# Patient Record
Sex: FEMALE | Race: WHITE | NOT HISPANIC OR LATINO | Employment: PART TIME | URBAN - METROPOLITAN AREA
[De-identification: names, ages, dates, MRNs, and addresses within clinical notes are randomized per-mention and may not be internally consistent; named-entity substitution may affect disease eponyms.]

---
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
## 2024-12-04 ENCOUNTER — HOSPITAL ENCOUNTER (EMERGENCY)
Facility: HOSPITAL | Age: 19
Discharge: HOME | End: 2024-12-04
Attending: INTERNAL MEDICINE
Payer: MEDICAID

## 2024-12-04 VITALS
HEART RATE: 62 BPM | DIASTOLIC BLOOD PRESSURE: 71 MMHG | SYSTOLIC BLOOD PRESSURE: 119 MMHG | WEIGHT: 200 LBS | RESPIRATION RATE: 16 BRPM | HEIGHT: 69 IN | BODY MASS INDEX: 29.62 KG/M2 | OXYGEN SATURATION: 100 % | TEMPERATURE: 98 F

## 2024-12-04 DIAGNOSIS — M25.571 RIGHT ANKLE PAIN: ICD-10-CM

## 2024-12-04 DIAGNOSIS — M79.671 RIGHT FOOT PAIN: ICD-10-CM

## 2024-12-04 LAB — B-HCG UR QL: NEGATIVE

## 2024-12-04 PROCEDURE — 81025 URINE PREGNANCY TEST: CPT | Performed by: PHYSICIAN ASSISTANT

## 2024-12-04 PROCEDURE — 25000003 PHARM REV CODE 250: Performed by: PHYSICIAN ASSISTANT

## 2024-12-04 PROCEDURE — 99284 EMERGENCY DEPT VISIT MOD MDM: CPT | Mod: 25

## 2024-12-04 RX ORDER — KETOROLAC TROMETHAMINE 10 MG/1
10 TABLET, FILM COATED ORAL
Status: COMPLETED | OUTPATIENT
Start: 2024-12-04 | End: 2024-12-04

## 2024-12-04 RX ORDER — KETOROLAC TROMETHAMINE 10 MG/1
10 TABLET, FILM COATED ORAL EVERY 6 HOURS
Qty: 20 TABLET | Refills: 0 | Status: SHIPPED | OUTPATIENT
Start: 2024-12-04 | End: 2024-12-09

## 2024-12-04 RX ORDER — METHOCARBAMOL 750 MG/1
1500 TABLET, FILM COATED ORAL 3 TIMES DAILY
Qty: 42 TABLET | Refills: 0 | Status: SHIPPED | OUTPATIENT
Start: 2024-12-04 | End: 2024-12-11

## 2024-12-04 RX ADMIN — KETOROLAC TROMETHAMINE 10 MG: 10 TABLET, FILM COATED ORAL at 06:12

## 2024-12-04 NOTE — Clinical Note
"Omar Bolanos" Cristi was seen and treated in our emergency department on 12/4/2024.  She may return to work on 12/04/2024.       If you have any questions or concerns, please don't hesitate to call.       RN    "

## 2024-12-05 NOTE — DISCHARGE INSTRUCTIONS
Ice and elevation, 20 minutes on and 20 minutes off.  Wear Ace bandage for support.  Use Toradol for pain and swelling.  Take Robaxin for muscle pain and spasms.  Follow up with PCP for further evaluation with possible MRI/Orthopedics.

## 2024-12-05 NOTE — ED PROVIDER NOTES
Encounter Date: 12/4/2024       History     Chief Complaint   Patient presents with    Ankle Pain     R ankle pain   mult trips and twist over past 3 months   re injured 2 days ago       19-year-old female presents to ED for evaluation of right foot pain and swelling.  Patient reports 2 weeks ago that she fell out of a truck and then several days ago she fell out of a tree onto her foot.  States that she was now unable to walk on her foot.  Complains of pain in her right heel that goes into her forefoot.   Patient states she was to walk 2 miles every day for softball in his having pain when doing this.  Has not taken any medications.    The history is provided by the patient. No  was used.     Review of patient's allergies indicates:   Allergen Reactions    Blueberry     Claritin [loratadine]     Flexeril [cyclobenzaprine]      Past Medical History:   Diagnosis Date    Asthma      Past Surgical History:   Procedure Laterality Date    ADENOIDECTOMY      CHOLECYSTECTOMY      TONSILLECTOMY       Family History   Problem Relation Name Age of Onset    Hypertension Mother      Diabetes Father       Social History     Tobacco Use    Smoking status: Never    Smokeless tobacco: Never   Substance Use Topics    Alcohol use: Never    Drug use: Never     Review of Systems   Constitutional:  Negative for chills, fatigue and fever.   Respiratory:  Negative for shortness of breath.    Cardiovascular:  Negative for chest pain.   Gastrointestinal:  Negative for abdominal pain, diarrhea, nausea and vomiting.   Genitourinary:  Negative for dysuria, flank pain, frequency and urgency.   Musculoskeletal:  Positive for back pain and myalgias.   All other systems reviewed and are negative.      Physical Exam     Initial Vitals [12/04/24 1541]   BP Pulse Resp Temp SpO2   127/74 (!) 57 16 97.8 °F (36.6 °C) 100 %      MAP       --         Physical Exam    Nursing note and vitals reviewed.  Constitutional: She appears  well-developed and well-nourished.   HENT:   Head: Normocephalic and atraumatic.   Right Ear: Tympanic membrane and external ear normal.   Left Ear: Tympanic membrane and external ear normal. Mouth/Throat: Uvula is midline, oropharynx is clear and moist and mucous membranes are normal. No trismus in the jaw. No uvula swelling. No oropharyngeal exudate, posterior oropharyngeal edema or posterior oropharyngeal erythema.   Eyes: Conjunctivae are normal. Pupils are equal, round, and reactive to light.   Neck: Neck supple.   Normal range of motion.  Cardiovascular:  Normal rate, regular rhythm and normal heart sounds.           Pulmonary/Chest: Breath sounds normal. She has no wheezes. She has no rhonchi. She has no rales.   Abdominal: Abdomen is soft. Bowel sounds are normal. There is no abdominal tenderness.   Musculoskeletal:         General: Normal range of motion.      Cervical back: Normal range of motion and neck supple.      Right foot: Normal range of motion. Tenderness present. No swelling, deformity, bony tenderness or crepitus.        Feet:       Comments: DP pulses 2+. All other adjacent joints otherwise normal       Neurological: She is alert and oriented to person, place, and time. She has normal strength. No sensory deficit. GCS score is 15. GCS eye subscore is 4. GCS verbal subscore is 5. GCS motor subscore is 6.   Skin: Skin is warm and dry.   Psychiatric: She has a normal mood and affect.         ED Course   Procedures  Labs Reviewed   PREGNANCY TEST, URINE RAPID - Normal       Result Value    hCG Qualitative, Urine Negative            Imaging Results              X-Ray Foot Complete Right (Final result)  Result time 12/04/24 18:04:50      Final result by Arsh Lyon MD (12/04/24 18:04:50)                   Impression:      No acute osseous abnormality.      Electronically signed by: Arsh Lyon MD  Date:    12/04/2024  Time:    18:04               Narrative:    EXAMINATION:  Right foot three  views    CLINICAL HISTORY:  Pain    COMPARISON:  None    FINDINGS:  No fracture subluxation seen.  Joint spaces are maintained.  No erosive or proliferative changes.  No focal soft tissue swelling.                                       X-Ray Ankle Complete Right (Final result)  Result time 12/04/24 16:54:48      Final result by Charli Coats MD (12/04/24 16:54:48)                   Narrative:    EXAMINATION  XR ANKLE COMPLETE 3 VIEW RIGHT    CLINICAL HISTORY  Pain in right ankle and joints of right foot    TECHNIQUE  A total of 3 images submitted of the right ankle.    COMPARISON  None available at the time of initial interpretation.    FINDINGS  No displaced fracture or dislocation is identified. The visualized joint spaces are preserved and there are no findings indicative of a joint effusion. No aggressive osseous lesion or periosteal reaction is identified.    The included soft tissues are without acute abnormality.    IMPRESSION  No convincing acute abnormality.      Electronically signed by: Charli Coats  Date:    12/04/2024  Time:    16:54                                     Medications   ketorolac tablet 10 mg (10 mg Oral Given 12/4/24 1826)     Medical Decision Making    19-year-old female presents to ED for evaluation of right foot pain and swelling.  Patient reports 2 weeks ago that she fell out of a truck and then several days ago she fell out of a tree onto her foot.  States that she was now unable to walk on her foot.  Complains of pain in her right heel that goes into her forefoot.   Patient states she was to walk 2 miles every day for softball in his having pain when doing this.  Has not taken any medications.      Differential diagnosis includes but isn't limited to contusion, fracture, dislocation, sprain, strain, internal foot injury.    Amount and/or Complexity of Data Reviewed  Radiology: ordered.  Discussion of management or test interpretation with external provider(s):   Patient GCS 15 and  neuro intact.  Ambulatory with steady gait.  Presents to ED for evaluation of right foot pain and swelling.  Patient reports having multiple injuries her foot and now she has pain in her right heel that goes into the for foot.  No medications taken.  X-ray obtained here without any acute findings.  Discussed follow up with PCP for possible MRI/orthopedic referral.  Will give short course of NSAIDs and muscle relaxers.  Patient verbalizes understanding    Risk  Prescription drug management.                                      Clinical Impression:  Final diagnoses:  [M25.571] Right ankle pain  [M79.671] Right foot pain          ED Disposition Condition    Discharge           ED Prescriptions       Medication Sig Dispense Start Date End Date Auth. Provider    ketorolac (TORADOL) 10 mg tablet Take 1 tablet (10 mg total) by mouth every 6 (six) hours. for 5 days 20 tablet 12/4/2024 12/9/2024 Clarisa Miguel PA    methocarbamoL (ROBAXIN) 750 MG Tab Take 2 tablets (1,500 mg total) by mouth 3 (three) times daily. for 7 days 42 tablet 12/4/2024 12/11/2024 Clarisa Miguel PA          Follow-up Information       Follow up With Specialties Details Why Contact Info    PCP  In 1 week As needed, If you do not have a PCP you may call [phone] to help get set up If you do not have a PCP you may call [phone] to help get set up.             Clarisa Miguel PA  12/04/24 6469

## 2025-04-28 ENCOUNTER — OFFICE VISIT (OUTPATIENT)
Dept: URGENT CARE | Facility: CLINIC | Age: 20
End: 2025-04-28
Payer: MEDICAID

## 2025-04-28 VITALS
SYSTOLIC BLOOD PRESSURE: 118 MMHG | TEMPERATURE: 99 F | RESPIRATION RATE: 18 BRPM | DIASTOLIC BLOOD PRESSURE: 79 MMHG | HEART RATE: 83 BPM | BODY MASS INDEX: 31.1 KG/M2 | WEIGHT: 210 LBS | OXYGEN SATURATION: 97 % | HEIGHT: 69 IN

## 2025-04-28 DIAGNOSIS — S40.862A INSECT BITE OF LEFT UPPER EXTREMITY, INITIAL ENCOUNTER: Primary | ICD-10-CM
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
## 2025-04-28 DIAGNOSIS — L03.114 CELLULITIS OF LEFT UPPER EXTREMITY: ICD-10-CM

## 2025-04-28 PROCEDURE — 99203 OFFICE O/P NEW LOW 30 MIN: CPT | Mod: ,,, | Performed by: FAMILY MEDICINE

## 2025-04-28 RX ORDER — ETONOGESTREL 68 MG/1
IMPLANT SUBCUTANEOUS
COMMUNITY

## 2025-04-28 RX ORDER — DOXYCYCLINE 100 MG/1
100 CAPSULE ORAL EVERY 12 HOURS
Qty: 14 CAPSULE | Refills: 0 | Status: SHIPPED | OUTPATIENT
Start: 2025-04-28 | End: 2025-05-05

## 2025-04-28 RX ORDER — PREDNISONE 20 MG/1
20 TABLET ORAL 2 TIMES DAILY
Qty: 6 TABLET | Refills: 0 | Status: SHIPPED | OUTPATIENT
Start: 2025-04-28 | End: 2025-05-01

## 2025-04-28 NOTE — PROGRESS NOTES
"Patient ID: Omar Colin is a 19 y.o. female.  Chief Complaint: Insect Bite    HPI:   Patient presents here today for above reason.      Patient is a 19 y.o. female who presents to urgent care with complaints of possible spider bite on left arm, itchiness, warm to touch and expanding, fever x yesterday.   Marked area of redness following initial discovery of bite/puncture wound etc..  Area has increased outwardly circumferentially.          Past Medical History:  Past Medical History:   Diagnosis Date    Asthma      Past Surgical History:   Procedure Laterality Date    ADENOIDECTOMY      CHOLECYSTECTOMY      TONSILLECTOMY       Review of patient's allergies indicates:   Allergen Reactions    Flexeril [cyclobenzaprine] Anaphylaxis    Blueberry     Claritin [loratadine]      Current Outpatient Medications   Medication Instructions    albuterol (PROVENTIL HFA) 90 mcg/actuation inhaler 2 puffs, Inhalation, Every 6 hours PRN, Rescue    doxycycline (MONODOX) 100 mg, Oral, Every 12 hours    etonogestreL (NEXPLANON) 68 mg Impl intradermal implant     hyoscyamine (ANASPAZ,LEVSIN) 125 mcg, Oral, Every 6 hours PRN    predniSONE (DELTASONE) 20 mg, Oral, 2 times daily     Social History[1]    ROS:   Review of Systems  12 point review of systems conducted, negative except as stated in the history of present illness. See HPI for details.   Vitals/PE:   Visit Vitals  /79   Pulse 83   Temp 98.5 °F (36.9 °C) (Oral)   Resp 18   Ht 5' 9" (1.753 m)   Wt 95.3 kg (210 lb)   LMP 04/21/2025 (Approximate)   SpO2 97%   BMI 31.01 kg/m²     Physical Exam  Vitals and nursing note reviewed.   Constitutional:       General: She is not in acute distress.     Appearance: Normal appearance. She is not ill-appearing, toxic-appearing or diaphoretic.   HENT:      Head: Normocephalic and atraumatic.      Right Ear: Tympanic membrane normal.      Left Ear: Tympanic membrane normal.      Mouth/Throat:      Mouth: Mucous membranes are dry.      " Pharynx: Oropharynx is clear.   Eyes:      Extraocular Movements: Extraocular movements intact.      Conjunctiva/sclera: Conjunctivae normal.      Pupils: Pupils are equal, round, and reactive to light.   Neck:      Vascular: No carotid bruit.   Cardiovascular:      Rate and Rhythm: Normal rate and regular rhythm.      Pulses: Normal pulses.      Heart sounds: Normal heart sounds. No murmur heard.     No friction rub. No gallop.   Pulmonary:      Effort: Pulmonary effort is normal. No respiratory distress.      Breath sounds: No stridor. No wheezing or rhonchi.   Abdominal:      General: There is no distension.      Palpations: There is no mass.      Tenderness: There is no abdominal tenderness. There is no left CVA tenderness, guarding or rebound.      Hernia: No hernia is present.   Musculoskeletal:         General: No swelling or signs of injury. Normal range of motion.      Cervical back: Normal range of motion and neck supple. No rigidity or tenderness.      Right lower leg: No edema.      Left lower leg: No edema.   Lymphadenopathy:      Cervical: No cervical adenopathy.   Skin:     Capillary Refill: Capillary refill takes less than 2 seconds.      Coloration: Skin is not jaundiced.      Findings: No bruising, lesion or rash.          Neurological:      General: No focal deficit present.      Mental Status: She is alert and oriented to person, place, and time. Mental status is at baseline.      Cranial Nerves: No cranial nerve deficit.      Sensory: No sensory deficit.      Motor: No weakness.      Coordination: Coordination normal.      Gait: Gait normal.   Psychiatric:         Mood and Affect: Mood normal.         Behavior: Behavior normal.         Thought Content: Thought content normal.         Judgment: Judgment normal.         Results for orders placed or performed during the hospital encounter of 12/04/24   Pregnancy, urine rapid    Collection Time: 12/04/24  5:36 PM   Result Value Ref Range    hCG  Qualitative, Urine Negative Negative     Assessment/Plan:   Insect bite of left upper extremity, initial encounter  -     predniSONE (DELTASONE) 20 MG tablet; Take 1 tablet (20 mg total) by mouth 2 (two) times daily. for 3 days  Dispense: 6 tablet; Refill: 0  Treatment as above and below.  May return to clinic should symptoms fail to improve or worsen.  Verbalized understanding.  Cellulitis of left upper extremity  -     doxycycline (MONODOX) 100 MG capsule; Take 1 capsule (100 mg total) by mouth every 12 (twelve) hours. for 7 days  Dispense: 14 capsule; Refill: 0           Education and counseling done face to face regarding medical conditions and plan. Contact office if new symptoms develop. Should any symptoms ever significantly worsen seek emergency medical attention/go to ER. Follow up at least yearly for wellness or sooner PRN. Nurse to call patient with any results. The patient is receptive, expresses understanding and is agreeable to plan. All questions have been answered.             [1]   Social History  Socioeconomic History    Marital status: Single   Tobacco Use    Smoking status: Never    Smokeless tobacco: Never   Substance and Sexual Activity    Alcohol use: Never    Drug use: Never

## 2025-04-28 NOTE — PATIENT INSTRUCTIONS
Assessment/Plan:   Insect bite of left upper extremity, initial encounter  -     predniSONE (DELTASONE) 20 MG tablet; Take 1 tablet (20 mg total) by mouth 2 (two) times daily. for 3 days  Dispense: 6 tablet; Refill: 0  Treatment as above and below.  May return to clinic should symptoms fail to improve or worsen.  Verbalized understanding.  Cellulitis of left upper extremity  -     doxycycline (MONODOX) 100 MG capsule; Take 1 capsule (100 mg total) by mouth every 12 (twelve) hours. for 7 days  Dispense: 14 capsule; Refill: 0           Education and counseling done face to face regarding medical conditions and plan. Contact office if new symptoms develop. Should any symptoms ever significantly worsen seek emergency medical attention/go to ER